# Patient Record
Sex: MALE | Race: OTHER | Employment: OTHER | URBAN - METROPOLITAN AREA
[De-identification: names, ages, dates, MRNs, and addresses within clinical notes are randomized per-mention and may not be internally consistent; named-entity substitution may affect disease eponyms.]

---

## 2017-06-23 ENCOUNTER — PREPPED CHART (OUTPATIENT)
Dept: URBAN - METROPOLITAN AREA CLINIC 39 | Facility: CLINIC | Age: 72
End: 2017-06-23

## 2017-08-10 ASSESSMENT — TONOMETRY
OS_IOP_MMHG: 13
OD_IOP_MMHG: 11

## 2017-08-10 ASSESSMENT — VISUAL ACUITY
OD_CC: 20/100+1
OS_CC: 20/40+2
OS_PH: 20/30
OS_CC: J1
OD_CC: J12-2

## 2017-08-11 ENCOUNTER — ESTABLISHED PATIENT (OUTPATIENT)
Dept: URBAN - METROPOLITAN AREA CLINIC 39 | Facility: CLINIC | Age: 72
End: 2017-08-11

## 2017-08-11 DIAGNOSIS — H34.8310: ICD-10-CM

## 2017-08-11 DIAGNOSIS — H43.813: ICD-10-CM

## 2017-08-11 PROCEDURE — 1036F TOBACCO NON-USER: CPT

## 2017-08-11 PROCEDURE — G8785 BP SCRN NO PERF AT INTERVAL: HCPCS

## 2017-08-11 PROCEDURE — 92134 CPTRZ OPH DX IMG PST SGM RTA: CPT

## 2017-08-11 PROCEDURE — G8428 CUR MEDS NOT DOCUMENT: HCPCS

## 2017-08-11 PROCEDURE — 67028 INJECTION EYE DRUG: CPT

## 2017-08-11 PROCEDURE — 92014 COMPRE OPH EXAM EST PT 1/>: CPT

## 2017-08-11 ASSESSMENT — TONOMETRY
OS_IOP_MMHG: 12
OD_IOP_MMHG: 16

## 2017-08-11 ASSESSMENT — VISUAL ACUITY
OS_CC: 20/40
OD_CC: 20/200

## 2017-10-30 ENCOUNTER — ESTABLISHED PATIENT (OUTPATIENT)
Dept: URBAN - METROPOLITAN AREA CLINIC 39 | Facility: CLINIC | Age: 72
End: 2017-10-30

## 2017-10-30 DIAGNOSIS — H35.031: ICD-10-CM

## 2017-10-30 DIAGNOSIS — H43.813: ICD-10-CM

## 2017-10-30 DIAGNOSIS — H34.8310: ICD-10-CM

## 2017-10-30 DIAGNOSIS — E11.9: ICD-10-CM

## 2017-10-30 PROCEDURE — 9222550 BILAT EXTENDED OPHTHALMOSCOPY, FIRST

## 2017-10-30 PROCEDURE — 92134 CPTRZ OPH DX IMG PST SGM RTA: CPT

## 2017-10-30 PROCEDURE — 3072F LOW RISK FOR RETINOPATHY: CPT

## 2017-10-30 PROCEDURE — G8427 DOCREV CUR MEDS BY ELIG CLIN: HCPCS

## 2017-10-30 PROCEDURE — 67028 INJECTION EYE DRUG: CPT

## 2017-10-30 PROCEDURE — G8785 BP SCRN NO PERF AT INTERVAL: HCPCS

## 2017-10-30 PROCEDURE — 1036F TOBACCO NON-USER: CPT

## 2017-10-30 PROCEDURE — 2022F DILAT RTA XM EVC RTNOPTHY: CPT

## 2017-10-30 PROCEDURE — 92014 COMPRE OPH EXAM EST PT 1/>: CPT

## 2017-10-30 PROCEDURE — 92250 FUNDUS PHOTOGRAPHY W/I&R: CPT

## 2017-10-30 PROCEDURE — 92287 ANT SGM IMG IR FLRSCN ANGRPH: CPT

## 2017-10-30 ASSESSMENT — TONOMETRY
OS_IOP_MMHG: 16
OD_IOP_MMHG: 17

## 2017-10-30 ASSESSMENT — VISUAL ACUITY
OS_CC: 20/25-2
OD_CC: 20/50-2

## 2018-01-05 ENCOUNTER — ESTABLISHED PATIENT (OUTPATIENT)
Dept: URBAN - METROPOLITAN AREA CLINIC 39 | Facility: CLINIC | Age: 73
End: 2018-01-05

## 2018-01-05 DIAGNOSIS — E11.9: ICD-10-CM

## 2018-01-05 DIAGNOSIS — H34.8310: ICD-10-CM

## 2018-01-05 DIAGNOSIS — H43.813: ICD-10-CM

## 2018-01-05 DIAGNOSIS — H35.031: ICD-10-CM

## 2018-01-05 PROCEDURE — G8785 BP SCRN NO PERF AT INTERVAL: HCPCS

## 2018-01-05 PROCEDURE — 92012 INTRM OPH EXAM EST PATIENT: CPT

## 2018-01-05 PROCEDURE — 92134 CPTRZ OPH DX IMG PST SGM RTA: CPT

## 2018-01-05 PROCEDURE — 3072F LOW RISK FOR RETINOPATHY: CPT

## 2018-01-05 PROCEDURE — 67028 INJECTION EYE DRUG: CPT

## 2018-01-05 PROCEDURE — G8427 DOCREV CUR MEDS BY ELIG CLIN: HCPCS

## 2018-01-05 PROCEDURE — 2022F DILAT RTA XM EVC RTNOPTHY: CPT

## 2018-01-05 PROCEDURE — 1036F TOBACCO NON-USER: CPT

## 2018-01-05 ASSESSMENT — TONOMETRY
OD_IOP_MMHG: 15
OS_IOP_MMHG: 14

## 2018-01-05 ASSESSMENT — VISUAL ACUITY
OS_SC: 20/25-1
OD_SC: 20/60

## 2018-02-14 ENCOUNTER — ESTABLISHED PATIENT (OUTPATIENT)
Dept: URBAN - METROPOLITAN AREA CLINIC 39 | Facility: CLINIC | Age: 73
End: 2018-02-14

## 2018-02-14 DIAGNOSIS — H43.813: ICD-10-CM

## 2018-02-14 DIAGNOSIS — H25.9: ICD-10-CM

## 2018-02-14 DIAGNOSIS — H35.031: ICD-10-CM

## 2018-02-14 DIAGNOSIS — E11.9: ICD-10-CM

## 2018-02-14 DIAGNOSIS — H34.8310: ICD-10-CM

## 2018-02-14 DIAGNOSIS — H21.89: ICD-10-CM

## 2018-02-14 PROCEDURE — 1036F TOBACCO NON-USER: CPT

## 2018-02-14 PROCEDURE — G8427 DOCREV CUR MEDS BY ELIG CLIN: HCPCS

## 2018-02-14 PROCEDURE — 2022F DILAT RTA XM EVC RTNOPTHY: CPT

## 2018-02-14 PROCEDURE — 92134 CPTRZ OPH DX IMG PST SGM RTA: CPT

## 2018-02-14 PROCEDURE — 3072F LOW RISK FOR RETINOPATHY: CPT

## 2018-02-14 PROCEDURE — 92012 INTRM OPH EXAM EST PATIENT: CPT

## 2018-02-14 PROCEDURE — 67028 INJECTION EYE DRUG: CPT

## 2018-02-14 PROCEDURE — 9222650 BILAT EXTENDED OPHTHALMOSCOPY, F/U

## 2018-02-14 PROCEDURE — 92235 FLUORESCEIN ANGRPH MLTIFRAME: CPT

## 2018-02-14 PROCEDURE — G8785 BP SCRN NO PERF AT INTERVAL: HCPCS

## 2018-02-14 PROCEDURE — 92287 ANT SGM IMG IR FLRSCN ANGRPH: CPT

## 2018-02-14 ASSESSMENT — VISUAL ACUITY
OD_PH: 20/40-1
OD_CC: 20/60
OS_CC: 20/25-1

## 2018-02-14 ASSESSMENT — TONOMETRY
OD_IOP_MMHG: 15
OS_IOP_MMHG: 13

## 2018-03-28 ENCOUNTER — ESTABLISHED PATIENT (OUTPATIENT)
Dept: URBAN - METROPOLITAN AREA CLINIC 39 | Facility: CLINIC | Age: 73
End: 2018-03-28

## 2018-03-28 DIAGNOSIS — H34.8310: ICD-10-CM

## 2018-03-28 DIAGNOSIS — H43.813: ICD-10-CM

## 2018-03-28 DIAGNOSIS — H35.031: ICD-10-CM

## 2018-03-28 DIAGNOSIS — H21.89: ICD-10-CM

## 2018-03-28 DIAGNOSIS — E11.9: ICD-10-CM

## 2018-03-28 PROCEDURE — 92012 INTRM OPH EXAM EST PATIENT: CPT

## 2018-03-28 PROCEDURE — 9222650 BILAT EXTENDED OPHTHALMOSCOPY, F/U

## 2018-03-28 PROCEDURE — 1036F TOBACCO NON-USER: CPT

## 2018-03-28 PROCEDURE — G8427 DOCREV CUR MEDS BY ELIG CLIN: HCPCS

## 2018-03-28 PROCEDURE — 92134 CPTRZ OPH DX IMG PST SGM RTA: CPT

## 2018-03-28 PROCEDURE — 67028 INJECTION EYE DRUG: CPT

## 2018-03-28 PROCEDURE — 3072F LOW RISK FOR RETINOPATHY: CPT

## 2018-03-28 PROCEDURE — 2022F DILAT RTA XM EVC RTNOPTHY: CPT

## 2018-03-28 PROCEDURE — G8785 BP SCRN NO PERF AT INTERVAL: HCPCS

## 2018-03-28 ASSESSMENT — VISUAL ACUITY
OS_CC: 20/30-2
OD_CC: 20/70+2

## 2018-03-28 ASSESSMENT — TONOMETRY
OD_IOP_MMHG: 16
OS_IOP_MMHG: 14

## 2018-06-04 ENCOUNTER — ESTABLISHED PATIENT (OUTPATIENT)
Dept: URBAN - METROPOLITAN AREA CLINIC 39 | Facility: CLINIC | Age: 73
End: 2018-06-04

## 2018-06-04 DIAGNOSIS — H35.031: ICD-10-CM

## 2018-06-04 DIAGNOSIS — H21.89: ICD-10-CM

## 2018-06-04 DIAGNOSIS — E11.9: ICD-10-CM

## 2018-06-04 DIAGNOSIS — H34.8310: ICD-10-CM

## 2018-06-04 DIAGNOSIS — H43.813: ICD-10-CM

## 2018-06-04 PROCEDURE — 92235 FLUORESCEIN ANGRPH MLTIFRAME: CPT

## 2018-06-04 PROCEDURE — G8427 DOCREV CUR MEDS BY ELIG CLIN: HCPCS

## 2018-06-04 PROCEDURE — 92134 CPTRZ OPH DX IMG PST SGM RTA: CPT

## 2018-06-04 PROCEDURE — 1036F TOBACCO NON-USER: CPT

## 2018-06-04 PROCEDURE — 67028 INJECTION EYE DRUG: CPT

## 2018-06-04 PROCEDURE — 9222650 BILAT EXTENDED OPHTHALMOSCOPY, F/U

## 2018-06-04 PROCEDURE — 92012 INTRM OPH EXAM EST PATIENT: CPT

## 2018-06-04 PROCEDURE — 2022F DILAT RTA XM EVC RTNOPTHY: CPT

## 2018-06-04 PROCEDURE — 92250 FUNDUS PHOTOGRAPHY W/I&R: CPT

## 2018-06-04 PROCEDURE — 3072F LOW RISK FOR RETINOPATHY: CPT

## 2018-06-04 PROCEDURE — G8785 BP SCRN NO PERF AT INTERVAL: HCPCS

## 2018-06-04 PROCEDURE — 92287 ANT SGM IMG IR FLRSCN ANGRPH: CPT

## 2018-06-04 ASSESSMENT — VISUAL ACUITY
OD_CC: 20/70-2
OS_CC: 20/40+1

## 2018-06-04 ASSESSMENT — TONOMETRY
OD_IOP_MMHG: 16
OS_IOP_MMHG: 16

## 2018-07-09 ENCOUNTER — ESTABLISHED PATIENT (OUTPATIENT)
Dept: URBAN - METROPOLITAN AREA CLINIC 39 | Facility: CLINIC | Age: 73
End: 2018-07-09

## 2018-07-09 DIAGNOSIS — E11.9: ICD-10-CM

## 2018-07-09 DIAGNOSIS — H34.8310: ICD-10-CM

## 2018-07-09 DIAGNOSIS — H43.813: ICD-10-CM

## 2018-07-09 DIAGNOSIS — H35.031: ICD-10-CM

## 2018-07-09 PROCEDURE — G8428 CUR MEDS NOT DOCUMENT: HCPCS

## 2018-07-09 PROCEDURE — G8785 BP SCRN NO PERF AT INTERVAL: HCPCS

## 2018-07-09 PROCEDURE — 3072F LOW RISK FOR RETINOPATHY: CPT

## 2018-07-09 PROCEDURE — 92134 CPTRZ OPH DX IMG PST SGM RTA: CPT

## 2018-07-09 PROCEDURE — 67028 INJECTION EYE DRUG: CPT

## 2018-07-09 PROCEDURE — 2022F DILAT RTA XM EVC RTNOPTHY: CPT

## 2018-07-09 PROCEDURE — 92012 INTRM OPH EXAM EST PATIENT: CPT

## 2018-07-09 PROCEDURE — 9222650 BILAT EXTENDED OPHTHALMOSCOPY, F/U

## 2018-07-09 PROCEDURE — 1036F TOBACCO NON-USER: CPT

## 2018-07-09 ASSESSMENT — TONOMETRY
OD_IOP_MMHG: 19
OS_IOP_MMHG: 17

## 2018-07-09 ASSESSMENT — VISUAL ACUITY
OS_CC: 20/25-2
OD_CC: 20/70

## 2018-08-06 ENCOUNTER — ESTABLISHED PATIENT (OUTPATIENT)
Dept: URBAN - METROPOLITAN AREA CLINIC 39 | Facility: CLINIC | Age: 73
End: 2018-08-06

## 2018-08-06 DIAGNOSIS — H34.8310: ICD-10-CM

## 2018-08-06 DIAGNOSIS — H35.031: ICD-10-CM

## 2018-08-06 DIAGNOSIS — H35.3112: ICD-10-CM

## 2018-08-06 DIAGNOSIS — H43.813: ICD-10-CM

## 2018-08-06 DIAGNOSIS — H35.3122: ICD-10-CM

## 2018-08-06 DIAGNOSIS — H21.89: ICD-10-CM

## 2018-08-06 DIAGNOSIS — E11.9: ICD-10-CM

## 2018-08-06 PROCEDURE — 92235 FLUORESCEIN ANGRPH MLTIFRAME: CPT

## 2018-08-06 PROCEDURE — G9903 PT SCRN TBCO ID AS NON USER: HCPCS

## 2018-08-06 PROCEDURE — 9222650 BILAT EXTENDED OPHTHALMOSCOPY, F/U

## 2018-08-06 PROCEDURE — G8428 CUR MEDS NOT DOCUMENT: HCPCS

## 2018-08-06 PROCEDURE — 92287 ANT SGM IMG IR FLRSCN ANGRPH: CPT

## 2018-08-06 PROCEDURE — 1036F TOBACCO NON-USER: CPT

## 2018-08-06 PROCEDURE — 92012 INTRM OPH EXAM EST PATIENT: CPT

## 2018-08-06 PROCEDURE — 3072F LOW RISK FOR RETINOPATHY: CPT

## 2018-08-06 PROCEDURE — 92250 FUNDUS PHOTOGRAPHY W/I&R: CPT

## 2018-08-06 PROCEDURE — G8785 BP SCRN NO PERF AT INTERVAL: HCPCS

## 2018-08-06 PROCEDURE — 2022F DILAT RTA XM EVC RTNOPTHY: CPT

## 2018-08-06 ASSESSMENT — VISUAL ACUITY
OD_CC: 20/60-2
OS_CC: 20/30-2

## 2018-08-06 ASSESSMENT — TONOMETRY
OD_IOP_MMHG: 16
OS_IOP_MMHG: 18

## 2018-09-17 ENCOUNTER — ESTABLISHED PATIENT (OUTPATIENT)
Dept: URBAN - METROPOLITAN AREA CLINIC 39 | Facility: CLINIC | Age: 73
End: 2018-09-17

## 2018-09-17 DIAGNOSIS — H35.031: ICD-10-CM

## 2018-09-17 DIAGNOSIS — E11.9: ICD-10-CM

## 2018-09-17 DIAGNOSIS — H35.3122: ICD-10-CM

## 2018-09-17 DIAGNOSIS — H43.813: ICD-10-CM

## 2018-09-17 DIAGNOSIS — H35.3112: ICD-10-CM

## 2018-09-17 DIAGNOSIS — H21.89: ICD-10-CM

## 2018-09-17 DIAGNOSIS — H35.723: ICD-10-CM

## 2018-09-17 DIAGNOSIS — H34.8310: ICD-10-CM

## 2018-09-17 PROCEDURE — 2022F DILAT RTA XM EVC RTNOPTHY: CPT

## 2018-09-17 PROCEDURE — 92012 INTRM OPH EXAM EST PATIENT: CPT

## 2018-09-17 PROCEDURE — 3072F LOW RISK FOR RETINOPATHY: CPT

## 2018-09-17 PROCEDURE — 4177F TALK PT/CRGVR RE AREDS PREV: CPT

## 2018-09-17 PROCEDURE — 9222650 BILAT EXTENDED OPHTHALMOSCOPY, F/U

## 2018-09-17 PROCEDURE — 92134 CPTRZ OPH DX IMG PST SGM RTA: CPT

## 2018-09-17 PROCEDURE — G8785 BP SCRN NO PERF AT INTERVAL: HCPCS

## 2018-09-17 PROCEDURE — 67028 INJECTION EYE DRUG: CPT

## 2018-09-17 PROCEDURE — 1036F TOBACCO NON-USER: CPT

## 2018-09-17 PROCEDURE — 2019F DILATED MACUL EXAM DONE: CPT

## 2018-09-17 PROCEDURE — G9903 PT SCRN TBCO ID AS NON USER: HCPCS

## 2018-09-17 ASSESSMENT — VISUAL ACUITY
OD_SC: 20/50-1
OS_SC: 20/40+2

## 2018-09-17 ASSESSMENT — TONOMETRY
OD_IOP_MMHG: 15
OS_IOP_MMHG: 18

## 2018-09-24 ENCOUNTER — CATARACT CONSULT (OUTPATIENT)
Dept: URBAN - METROPOLITAN AREA CLINIC 39 | Facility: CLINIC | Age: 73
End: 2018-09-24

## 2018-09-24 VITALS
DIASTOLIC BLOOD PRESSURE: 91 MMHG | RESPIRATION RATE: 16 BRPM | HEIGHT: 60 IN | SYSTOLIC BLOOD PRESSURE: 148 MMHG | HEART RATE: 52 BPM

## 2018-09-24 DIAGNOSIS — H25.813: ICD-10-CM

## 2018-09-24 DIAGNOSIS — H35.3122: ICD-10-CM

## 2018-09-24 DIAGNOSIS — H18.51: ICD-10-CM

## 2018-09-24 DIAGNOSIS — H35.30: ICD-10-CM

## 2018-09-24 DIAGNOSIS — H34.8310: ICD-10-CM

## 2018-09-24 PROCEDURE — G8428 CUR MEDS NOT DOCUMENT: HCPCS

## 2018-09-24 PROCEDURE — 92286 ANT SGM IMG I&R SPECLR MIC: CPT

## 2018-09-24 PROCEDURE — 2019F DILATED MACUL EXAM DONE: CPT

## 2018-09-24 PROCEDURE — 4177F TALK PT/CRGVR RE AREDS PREV: CPT

## 2018-09-24 PROCEDURE — 92136TC INTERFEROMETRY - TECHNICAL COMPONENT

## 2018-09-24 PROCEDURE — 92134 CPTRZ OPH DX IMG PST SGM RTA: CPT

## 2018-09-24 PROCEDURE — 92014 COMPRE OPH EXAM EST PT 1/>: CPT

## 2018-09-24 PROCEDURE — G9903 PT SCRN TBCO ID AS NON USER: HCPCS

## 2018-09-24 PROCEDURE — G8952 PRE-HTN/HTN, NO F/U, NOT GVN: HCPCS

## 2018-09-24 PROCEDURE — 92015 DETERMINE REFRACTIVE STATE: CPT

## 2018-09-24 PROCEDURE — 1036F TOBACCO NON-USER: CPT

## 2018-09-24 ASSESSMENT — TONOMETRY
OS_IOP_MMHG: 14
OD_IOP_MMHG: 14

## 2018-09-24 ASSESSMENT — VISUAL ACUITY
OD_CC: 20/50-2
OS_BAT: 20/60 W/MR
OS_SC: J12
OD_SC: J12
OD_SC: 20/50
OS_CC: 20/25
OD_PAM: 20/30
OS_SC: 20/20-2
OS_CC: J1+
OD_BAT: 20/100 W/MR
OD_CC: J7

## 2018-10-08 ENCOUNTER — ESTABLISHED PATIENT (OUTPATIENT)
Dept: URBAN - METROPOLITAN AREA CLINIC 39 | Facility: CLINIC | Age: 73
End: 2018-10-08

## 2018-10-08 VITALS
DIASTOLIC BLOOD PRESSURE: 91 MMHG | HEART RATE: 52 BPM | RESPIRATION RATE: 16 BRPM | HEIGHT: 60 IN | SYSTOLIC BLOOD PRESSURE: 148 MMHG

## 2018-10-08 DIAGNOSIS — H25.811: ICD-10-CM

## 2018-10-08 DIAGNOSIS — H25.812: ICD-10-CM

## 2018-10-08 PROCEDURE — 4040F PNEUMOC VAC/ADMIN/RCVD: CPT

## 2018-10-08 PROCEDURE — 92025-1 CORNEAL TOPOGRAPHY, INS

## 2018-10-08 PROCEDURE — G8952 PRE-HTN/HTN, NO F/U, NOT GVN: HCPCS

## 2018-10-08 PROCEDURE — 1036F TOBACCO NON-USER: CPT

## 2018-10-08 PROCEDURE — 99213 OFFICE O/P EST LOW 20 MIN: CPT

## 2018-10-08 PROCEDURE — G8427 DOCREV CUR MEDS BY ELIG CLIN: HCPCS

## 2018-10-08 PROCEDURE — G9903 PT SCRN TBCO ID AS NON USER: HCPCS

## 2018-10-08 RX ORDER — NEPAFENAC 3 MG/ML: 1 SUSPENSION/ DROPS OPHTHALMIC ONCE A DAY

## 2018-10-08 RX ORDER — MOXIFLOXACIN HYDROCHLORIDE 5 MG/ML: 1 SOLUTION/ DROPS OPHTHALMIC

## 2018-10-08 ASSESSMENT — VISUAL ACUITY
OD_PAM: 20/80
OS_BAT: 20/60
OS_CC: 20/30
OD_BAT: 20/100
OD_CC: 20/50-2
OS_AM: 20/20
OS_CC: J2
OD_CC: J10

## 2018-10-08 ASSESSMENT — TONOMETRY
OS_IOP_MMHG: 15
OD_IOP_MMHG: 15

## 2018-10-16 ENCOUNTER — SURGERY/PROCEDURE (OUTPATIENT)
Dept: URBAN - METROPOLITAN AREA CLINIC 39 | Facility: CLINIC | Age: 73
End: 2018-10-16

## 2018-10-16 ENCOUNTER — PRE-OP/H&P (OUTPATIENT)
Dept: URBAN - METROPOLITAN AREA CLINIC 39 | Facility: CLINIC | Age: 73
End: 2018-10-16

## 2018-10-16 VITALS
SYSTOLIC BLOOD PRESSURE: 148 MMHG | RESPIRATION RATE: 16 BRPM | HEART RATE: 52 BPM | DIASTOLIC BLOOD PRESSURE: 91 MMHG | HEIGHT: 60 IN

## 2018-10-16 DIAGNOSIS — H25.811: ICD-10-CM

## 2018-10-16 PROCEDURE — G8482 FLU IMMUNIZE ORDER/ADMIN: HCPCS

## 2018-10-16 PROCEDURE — G8952 PRE-HTN/HTN, NO F/U, NOT GVN: HCPCS

## 2018-10-16 PROCEDURE — 1036F TOBACCO NON-USER: CPT

## 2018-10-16 PROCEDURE — 99211T TECH SERVICE

## 2018-10-16 PROCEDURE — G9903 PT SCRN TBCO ID AS NON USER: HCPCS

## 2018-10-16 PROCEDURE — 4040F PNEUMOC VAC/ADMIN/RCVD: CPT

## 2018-10-16 PROCEDURE — G8418 CALC BMI BLW LOW PARAM F/U: HCPCS

## 2018-10-16 PROCEDURE — 66984 XCAPSL CTRC RMVL W/O ECP: CPT

## 2018-10-16 PROCEDURE — G8428 CUR MEDS NOT DOCUMENT: HCPCS

## 2018-10-17 ENCOUNTER — CATARACT POST-OP 1-DAY (OUTPATIENT)
Dept: URBAN - METROPOLITAN AREA CLINIC 39 | Facility: CLINIC | Age: 73
End: 2018-10-17

## 2018-10-17 DIAGNOSIS — Z96.1: ICD-10-CM

## 2018-10-17 PROCEDURE — 99024 POSTOP FOLLOW-UP VISIT: CPT

## 2018-10-17 ASSESSMENT — VISUAL ACUITY
OD_SC: 20/40
OD_SC: <J12
OS_SC: 20/30
OS_SC: J1

## 2018-10-17 ASSESSMENT — TONOMETRY: OD_IOP_MMHG: 19

## 2018-11-19 ENCOUNTER — ESTABLISHED COMPREHENSIVE EXAM (OUTPATIENT)
Dept: URBAN - METROPOLITAN AREA CLINIC 39 | Facility: CLINIC | Age: 73
End: 2018-11-19

## 2018-11-19 DIAGNOSIS — H43.813: ICD-10-CM

## 2018-11-19 DIAGNOSIS — H35.723: ICD-10-CM

## 2018-11-19 DIAGNOSIS — H35.3122: ICD-10-CM

## 2018-11-19 DIAGNOSIS — H35.031: ICD-10-CM

## 2018-11-19 DIAGNOSIS — H35.3112: ICD-10-CM

## 2018-11-19 DIAGNOSIS — H34.8310: ICD-10-CM

## 2018-11-19 DIAGNOSIS — H21.89: ICD-10-CM

## 2018-11-19 PROCEDURE — G8785 BP SCRN NO PERF AT INTERVAL: HCPCS

## 2018-11-19 PROCEDURE — 92242 FLUORESCEIN&ICG ANGIOGRAPHY: CPT

## 2018-11-19 PROCEDURE — G9903 PT SCRN TBCO ID AS NON USER: HCPCS

## 2018-11-19 PROCEDURE — 9222650 BILAT EXTENDED OPHTHALMOSCOPY, F/U

## 2018-11-19 PROCEDURE — G8427 DOCREV CUR MEDS BY ELIG CLIN: HCPCS

## 2018-11-19 PROCEDURE — 2019F DILATED MACUL EXAM DONE: CPT

## 2018-11-19 PROCEDURE — 67028 INJECTION EYE DRUG: CPT

## 2018-11-19 PROCEDURE — 92012 INTRM OPH EXAM EST PATIENT: CPT

## 2018-11-19 PROCEDURE — 4177F TALK PT/CRGVR RE AREDS PREV: CPT

## 2018-11-19 PROCEDURE — 92134 CPTRZ OPH DX IMG PST SGM RTA: CPT

## 2018-11-19 PROCEDURE — 1036F TOBACCO NON-USER: CPT

## 2018-11-19 ASSESSMENT — TONOMETRY
OS_IOP_MMHG: 13
OD_IOP_MMHG: 13

## 2018-11-19 ASSESSMENT — VISUAL ACUITY
OS_CC: 20/40
OD_CC: 20/40

## 2018-12-05 ENCOUNTER — CLINIC PROCEDURE ONLY (OUTPATIENT)
Dept: URBAN - METROPOLITAN AREA CLINIC 39 | Facility: CLINIC | Age: 73
End: 2018-12-05

## 2018-12-05 DIAGNOSIS — H34.8310: ICD-10-CM

## 2018-12-05 PROCEDURE — 67028 INJECTION EYE DRUG: CPT

## 2018-12-05 ASSESSMENT — TONOMETRY: OD_IOP_MMHG: 16

## 2018-12-05 ASSESSMENT — VISUAL ACUITY
OD_SC: 20/40+2
OS_SC: 20/25

## 2018-12-13 ENCOUNTER — POST-OP (OUTPATIENT)
Dept: URBAN - METROPOLITAN AREA CLINIC 39 | Facility: CLINIC | Age: 73
End: 2018-12-13

## 2018-12-13 DIAGNOSIS — Z96.1: ICD-10-CM

## 2018-12-13 PROCEDURE — 99024 POSTOP FOLLOW-UP VISIT: CPT

## 2018-12-13 ASSESSMENT — VISUAL ACUITY
OS_SC: 20/40
OD_SC: 20/40
OS_CC: J2
OD_CC: J12
OD_SC: J12
OS_SC: J6

## 2018-12-13 ASSESSMENT — TONOMETRY
OD_IOP_MMHG: 13
OS_IOP_MMHG: 12

## 2019-01-07 ENCOUNTER — ESTABLISHED PATIENT (OUTPATIENT)
Dept: URBAN - METROPOLITAN AREA CLINIC 39 | Facility: CLINIC | Age: 74
End: 2019-01-07

## 2019-01-07 DIAGNOSIS — H43.813: ICD-10-CM

## 2019-01-07 DIAGNOSIS — H34.8310: ICD-10-CM

## 2019-01-07 DIAGNOSIS — H35.3112: ICD-10-CM

## 2019-01-07 DIAGNOSIS — E11.9: ICD-10-CM

## 2019-01-07 DIAGNOSIS — H35.3122: ICD-10-CM

## 2019-01-07 PROCEDURE — 92134 CPTRZ OPH DX IMG PST SGM RTA: CPT

## 2019-01-07 PROCEDURE — 92012 INTRM OPH EXAM EST PATIENT: CPT

## 2019-01-07 PROCEDURE — G9903 PT SCRN TBCO ID AS NON USER: HCPCS

## 2019-01-07 PROCEDURE — G8428 CUR MEDS NOT DOCUMENT: HCPCS

## 2019-01-07 PROCEDURE — 1036F TOBACCO NON-USER: CPT

## 2019-01-07 PROCEDURE — 2022F DILAT RTA XM EVC RTNOPTHY: CPT

## 2019-01-07 PROCEDURE — G8785 BP SCRN NO PERF AT INTERVAL: HCPCS

## 2019-01-07 PROCEDURE — 3072F LOW RISK FOR RETINOPATHY: CPT

## 2019-01-07 ASSESSMENT — TONOMETRY
OS_IOP_MMHG: 17
OD_IOP_MMHG: 15

## 2019-01-07 ASSESSMENT — VISUAL ACUITY
OD_CC: 20/40
OS_CC: 20/25-2

## 2019-02-25 ENCOUNTER — ESTABLISHED PATIENT (OUTPATIENT)
Dept: URBAN - METROPOLITAN AREA CLINIC 39 | Facility: CLINIC | Age: 74
End: 2019-02-25

## 2019-02-25 DIAGNOSIS — H34.8310: ICD-10-CM

## 2019-02-25 DIAGNOSIS — H35.3112: ICD-10-CM

## 2019-02-25 DIAGNOSIS — H35.3122: ICD-10-CM

## 2019-02-25 PROCEDURE — 67028 INJECTION EYE DRUG: CPT

## 2019-02-25 PROCEDURE — 92226 OPHTHALMOSCOPY (SUB): CPT

## 2019-02-25 PROCEDURE — 92250 FUNDUS PHOTOGRAPHY W/I&R: CPT

## 2019-02-25 PROCEDURE — 92012 INTRM OPH EXAM EST PATIENT: CPT

## 2019-02-25 PROCEDURE — 92235 FLUORESCEIN ANGRPH MLTIFRAME: CPT

## 2019-02-25 ASSESSMENT — TONOMETRY
OD_IOP_MMHG: 17
OS_IOP_MMHG: 18

## 2019-02-25 ASSESSMENT — VISUAL ACUITY
OS_CC: 20/40-1
OD_CC: 20/40-2

## 2019-04-01 ENCOUNTER — ESTABLISHED PATIENT (OUTPATIENT)
Dept: URBAN - METROPOLITAN AREA CLINIC 39 | Facility: CLINIC | Age: 74
End: 2019-04-01

## 2019-04-01 DIAGNOSIS — H34.8310: ICD-10-CM

## 2019-04-01 DIAGNOSIS — H35.3122: ICD-10-CM

## 2019-04-01 DIAGNOSIS — H43.813: ICD-10-CM

## 2019-04-01 PROCEDURE — 92134 CPTRZ OPH DX IMG PST SGM RTA: CPT

## 2019-04-01 PROCEDURE — 67028 INJECTION EYE DRUG: CPT

## 2019-04-01 PROCEDURE — 92012 INTRM OPH EXAM EST PATIENT: CPT

## 2019-04-01 ASSESSMENT — TONOMETRY
OD_IOP_MMHG: 12
OS_IOP_MMHG: 13

## 2019-04-01 ASSESSMENT — VISUAL ACUITY
OS_CC: 20/30
OD_CC: 20/30-1

## 2019-04-15 ENCOUNTER — ESTABLISHED PATIENT (OUTPATIENT)
Dept: URBAN - METROPOLITAN AREA CLINIC 39 | Facility: CLINIC | Age: 74
End: 2019-04-15

## 2019-04-15 DIAGNOSIS — H34.8310: ICD-10-CM

## 2019-04-15 PROCEDURE — 67028 INJECTION EYE DRUG: CPT

## 2019-04-15 ASSESSMENT — TONOMETRY
OS_IOP_MMHG: 16
OD_IOP_MMHG: 15

## 2019-04-15 ASSESSMENT — VISUAL ACUITY
OS_CC: 20/40
OD_CC: 20/40

## 2019-05-13 ENCOUNTER — ESTABLISHED PATIENT (OUTPATIENT)
Dept: URBAN - METROPOLITAN AREA CLINIC 39 | Facility: CLINIC | Age: 74
End: 2019-05-13

## 2019-05-13 DIAGNOSIS — H34.8310: ICD-10-CM

## 2019-05-13 DIAGNOSIS — H35.723: ICD-10-CM

## 2019-05-13 DIAGNOSIS — H35.3112: ICD-10-CM

## 2019-05-13 DIAGNOSIS — H35.3122: ICD-10-CM

## 2019-05-13 PROCEDURE — 67028 INJECTION EYE DRUG: CPT

## 2019-05-13 PROCEDURE — 92250 FUNDUS PHOTOGRAPHY W/I&R: CPT

## 2019-05-13 PROCEDURE — 92235 FLUORESCEIN ANGRPH MLTIFRAME: CPT

## 2019-05-13 PROCEDURE — 92226 OPHTHALMOSCOPY (SUB): CPT

## 2019-05-13 PROCEDURE — 92012 INTRM OPH EXAM EST PATIENT: CPT

## 2019-05-13 ASSESSMENT — VISUAL ACUITY
OD_CC: 20/40
OS_CC: 20/40

## 2019-05-13 ASSESSMENT — TONOMETRY
OD_IOP_MMHG: 17
OS_IOP_MMHG: 16

## 2019-06-24 ENCOUNTER — ESTABLISHED PATIENT (OUTPATIENT)
Dept: URBAN - METROPOLITAN AREA CLINIC 39 | Facility: CLINIC | Age: 74
End: 2019-06-24

## 2019-06-24 DIAGNOSIS — H35.3122: ICD-10-CM

## 2019-06-24 DIAGNOSIS — H34.8310: ICD-10-CM

## 2019-06-24 DIAGNOSIS — H35.3112: ICD-10-CM

## 2019-06-24 PROCEDURE — 92250 FUNDUS PHOTOGRAPHY W/I&R: CPT

## 2019-06-24 PROCEDURE — 67028 INJECTION EYE DRUG: CPT

## 2019-06-24 PROCEDURE — 92012 INTRM OPH EXAM EST PATIENT: CPT

## 2019-06-24 PROCEDURE — 92134 CPTRZ OPH DX IMG PST SGM RTA: CPT

## 2019-06-24 ASSESSMENT — TONOMETRY
OD_IOP_MMHG: 16
OS_IOP_MMHG: 15

## 2019-06-24 ASSESSMENT — VISUAL ACUITY
OS_CC: 20/40
OS_PH: 20/30-2
OD_CC: 20/40

## 2019-08-19 ENCOUNTER — ESTABLISHED PATIENT (OUTPATIENT)
Dept: URBAN - METROPOLITAN AREA CLINIC 39 | Facility: CLINIC | Age: 74
End: 2019-08-19

## 2019-08-19 DIAGNOSIS — H35.723: ICD-10-CM

## 2019-08-19 DIAGNOSIS — H35.3122: ICD-10-CM

## 2019-08-19 DIAGNOSIS — H35.3112: ICD-10-CM

## 2019-08-19 DIAGNOSIS — H34.8310: ICD-10-CM

## 2019-08-19 PROCEDURE — 92012 INTRM OPH EXAM EST PATIENT: CPT

## 2019-08-19 PROCEDURE — 67028 INJECTION EYE DRUG: CPT

## 2019-08-19 PROCEDURE — 9222650 BILAT EXTENDED OPHTHALMOSCOPY, F/U

## 2019-08-19 PROCEDURE — 92134 CPTRZ OPH DX IMG PST SGM RTA: CPT

## 2019-08-19 PROCEDURE — 92235 FLUORESCEIN ANGRPH MLTIFRAME: CPT

## 2019-08-19 ASSESSMENT — TONOMETRY
OS_IOP_MMHG: 14
OD_IOP_MMHG: 15

## 2019-08-19 ASSESSMENT — VISUAL ACUITY
OD_CC: 20/30-1
OS_CC: 20/40

## 2019-10-28 ENCOUNTER — ESTABLISHED PATIENT (OUTPATIENT)
Dept: URBAN - METROPOLITAN AREA CLINIC 39 | Facility: CLINIC | Age: 74
End: 2019-10-28

## 2019-10-28 DIAGNOSIS — H35.3122: ICD-10-CM

## 2019-10-28 DIAGNOSIS — H34.8310: ICD-10-CM

## 2019-10-28 DIAGNOSIS — H35.3112: ICD-10-CM

## 2019-10-28 DIAGNOSIS — H35.723: ICD-10-CM

## 2019-10-28 PROCEDURE — 67028 INJECTION EYE DRUG: CPT

## 2019-10-28 PROCEDURE — 92250 FUNDUS PHOTOGRAPHY W/I&R: CPT

## 2019-10-28 PROCEDURE — 92235 FLUORESCEIN ANGRPH MLTIFRAME: CPT

## 2019-10-28 PROCEDURE — 92012 INTRM OPH EXAM EST PATIENT: CPT

## 2019-10-28 PROCEDURE — 9222650 BILAT EXTENDED OPHTHALMOSCOPY, F/U

## 2019-10-28 ASSESSMENT — TONOMETRY
OS_IOP_MMHG: 16
OD_IOP_MMHG: 14

## 2019-10-28 ASSESSMENT — VISUAL ACUITY
OS_CC: 20/40+2
OD_CC: 20/40

## 2019-11-04 ENCOUNTER — CLINIC PROCEDURE ONLY (OUTPATIENT)
Dept: URBAN - METROPOLITAN AREA CLINIC 39 | Facility: CLINIC | Age: 74
End: 2019-11-04

## 2019-11-04 DIAGNOSIS — H34.8310: ICD-10-CM

## 2019-11-04 PROCEDURE — 67028 INJECTION EYE DRUG: CPT

## 2019-11-04 ASSESSMENT — VISUAL ACUITY: OD_CC: 20/40

## 2019-11-04 ASSESSMENT — TONOMETRY: OD_IOP_MMHG: 14

## 2019-12-02 ENCOUNTER — ESTABLISHED PATIENT (OUTPATIENT)
Dept: URBAN - METROPOLITAN AREA CLINIC 39 | Facility: CLINIC | Age: 74
End: 2019-12-02

## 2019-12-02 DIAGNOSIS — H35.3122: ICD-10-CM

## 2019-12-02 DIAGNOSIS — H34.8310: ICD-10-CM

## 2019-12-02 DIAGNOSIS — H35.3112: ICD-10-CM

## 2019-12-02 PROCEDURE — 92273 FULL FIELD ERG W/I&R: CPT

## 2019-12-02 PROCEDURE — 92134 CPTRZ OPH DX IMG PST SGM RTA: CPT

## 2019-12-02 PROCEDURE — 67028 INJECTION EYE DRUG: CPT

## 2019-12-02 PROCEDURE — 92012 INTRM OPH EXAM EST PATIENT: CPT

## 2019-12-02 ASSESSMENT — TONOMETRY
OD_IOP_MMHG: 13
OS_IOP_MMHG: 15

## 2019-12-02 ASSESSMENT — VISUAL ACUITY
OS_PH: 20/40
OS_CC: 20/50
OD_CC: 20/40

## 2020-01-29 ENCOUNTER — ESTABLISHED PATIENT (OUTPATIENT)
Dept: URBAN - METROPOLITAN AREA CLINIC 39 | Facility: CLINIC | Age: 75
End: 2020-01-29

## 2020-01-29 DIAGNOSIS — H43.813: ICD-10-CM

## 2020-01-29 DIAGNOSIS — H35.3112: ICD-10-CM

## 2020-01-29 DIAGNOSIS — H35.031: ICD-10-CM

## 2020-01-29 DIAGNOSIS — H35.3122: ICD-10-CM

## 2020-01-29 DIAGNOSIS — H34.8310: ICD-10-CM

## 2020-01-29 DIAGNOSIS — H35.041: ICD-10-CM

## 2020-01-29 DIAGNOSIS — H35.30: ICD-10-CM

## 2020-01-29 DIAGNOSIS — H35.363: ICD-10-CM

## 2020-01-29 DIAGNOSIS — H35.81: ICD-10-CM

## 2020-01-29 DIAGNOSIS — H35.723: ICD-10-CM

## 2020-01-29 PROCEDURE — 92134 CPTRZ OPH DX IMG PST SGM RTA: CPT

## 2020-01-29 PROCEDURE — 92235 FLUORESCEIN ANGRPH MLTIFRAME: CPT

## 2020-01-29 PROCEDURE — 92250 FUNDUS PHOTOGRAPHY W/I&R: CPT

## 2020-01-29 PROCEDURE — 92012 INTRM OPH EXAM EST PATIENT: CPT

## 2020-01-29 PROCEDURE — 67028 INJECTION EYE DRUG: CPT

## 2020-01-29 ASSESSMENT — TONOMETRY
OS_IOP_MMHG: 16
OD_IOP_MMHG: 15

## 2020-01-29 ASSESSMENT — VISUAL ACUITY
OS_PH: 20/40-1
OD_CC: 20/40
OS_CC: 20/50

## 2020-05-20 ENCOUNTER — ESTABLISHED PATIENT (OUTPATIENT)
Dept: URBAN - METROPOLITAN AREA CLINIC 39 | Facility: CLINIC | Age: 75
End: 2020-05-20

## 2020-05-20 DIAGNOSIS — H35.723: ICD-10-CM

## 2020-05-20 DIAGNOSIS — H35.3112: ICD-10-CM

## 2020-05-20 DIAGNOSIS — H35.041: ICD-10-CM

## 2020-05-20 DIAGNOSIS — H35.3122: ICD-10-CM

## 2020-05-20 DIAGNOSIS — H34.8310: ICD-10-CM

## 2020-05-20 DIAGNOSIS — E11.9: ICD-10-CM

## 2020-05-20 PROCEDURE — J0178PRE EYLEA PREFILLED SYRINGE

## 2020-05-20 PROCEDURE — 67028 INJECTION EYE DRUG: CPT

## 2020-05-20 PROCEDURE — 92202 OPSCPY EXTND ON/MAC DRAW: CPT

## 2020-05-20 PROCEDURE — 92134 CPTRZ OPH DX IMG PST SGM RTA: CPT

## 2020-05-20 PROCEDURE — 92242 FLUORESCEIN&ICG ANGIOGRAPHY: CPT

## 2020-05-20 PROCEDURE — 92014 COMPRE OPH EXAM EST PT 1/>: CPT

## 2020-05-20 PROCEDURE — 92273 FULL FIELD ERG W/I&R: CPT

## 2020-05-20 ASSESSMENT — VISUAL ACUITY
OD_SC: 20/70
OS_SC: 20/60+1

## 2020-06-01 ENCOUNTER — CLINIC PROCEDURE ONLY (OUTPATIENT)
Dept: URBAN - METROPOLITAN AREA CLINIC 39 | Facility: CLINIC | Age: 75
End: 2020-06-01

## 2020-06-01 DIAGNOSIS — H34.8310: ICD-10-CM

## 2020-06-01 PROCEDURE — 67028 INJECTION EYE DRUG: CPT

## 2020-06-01 ASSESSMENT — VISUAL ACUITY
OD_SC: 20/60
OS_SC: 20/40

## 2020-07-06 ENCOUNTER — ESTABLISHED PATIENT (OUTPATIENT)
Dept: URBAN - METROPOLITAN AREA CLINIC 39 | Facility: CLINIC | Age: 75
End: 2020-07-06

## 2020-07-06 DIAGNOSIS — H43.813: ICD-10-CM

## 2020-07-06 DIAGNOSIS — H35.041: ICD-10-CM

## 2020-07-06 DIAGNOSIS — H35.81: ICD-10-CM

## 2020-07-06 DIAGNOSIS — H35.723: ICD-10-CM

## 2020-07-06 DIAGNOSIS — H35.031: ICD-10-CM

## 2020-07-06 DIAGNOSIS — H35.363: ICD-10-CM

## 2020-07-06 DIAGNOSIS — H34.8310: ICD-10-CM

## 2020-07-06 DIAGNOSIS — E11.9: ICD-10-CM

## 2020-07-06 DIAGNOSIS — H35.3132: ICD-10-CM

## 2020-07-06 PROCEDURE — 92014 COMPRE OPH EXAM EST PT 1/>: CPT

## 2020-07-06 PROCEDURE — 92134 CPTRZ OPH DX IMG PST SGM RTA: CPT

## 2020-07-06 PROCEDURE — 67028 INJECTION EYE DRUG: CPT

## 2020-07-06 PROCEDURE — 92201 OPSCPY EXTND RTA DRAW UNI/BI: CPT

## 2020-07-06 PROCEDURE — J0178PRE EYLEA PREFILLED SYRINGE

## 2020-07-06 ASSESSMENT — VISUAL ACUITY
OS_CC: 20/50-1
OS_SC: 20/40-1
OD_CC: 20/50-2

## 2020-07-13 ENCOUNTER — CATARACT CONSULT (OUTPATIENT)
Dept: URBAN - METROPOLITAN AREA CLINIC 39 | Facility: CLINIC | Age: 75
End: 2020-07-13

## 2020-07-13 DIAGNOSIS — H35.3132: ICD-10-CM

## 2020-07-13 DIAGNOSIS — H25.812: ICD-10-CM

## 2020-07-13 DIAGNOSIS — H35.723: ICD-10-CM

## 2020-07-13 DIAGNOSIS — E11.9: ICD-10-CM

## 2020-07-13 DIAGNOSIS — Z96.1: ICD-10-CM

## 2020-07-13 DIAGNOSIS — H34.8310: ICD-10-CM

## 2020-07-13 PROCEDURE — 92136TC INTERFEROMETRY - TECHNICAL COMPONENT

## 2020-07-13 PROCEDURE — 92025-1 CORNEAL TOPOGRAPHY, INS

## 2020-07-13 PROCEDURE — 92015 DETERMINE REFRACTIVE STATE: CPT

## 2020-07-13 PROCEDURE — 99214 OFFICE O/P EST MOD 30 MIN: CPT

## 2020-07-13 PROCEDURE — 92134 CPTRZ OPH DX IMG PST SGM RTA: CPT

## 2020-07-13 RX ORDER — DUREZOL 0.5 MG/ML: 1 EMULSION OPHTHALMIC TWICE A DAY

## 2020-07-13 RX ORDER — NEPAFENAC 3 MG/ML: 1 SUSPENSION/ DROPS OPHTHALMIC ONCE A DAY

## 2020-07-13 RX ORDER — MOXIFLOXACIN HYDROCHLORIDE 5 MG/ML: 1 SOLUTION/ DROPS OPHTHALMIC

## 2020-07-13 ASSESSMENT — TONOMETRY
OD_IOP_MMHG: 13
OS_IOP_MMHG: 13

## 2020-07-13 ASSESSMENT — VISUAL ACUITY
OD_CC: J6
OS_AM: 20/20
OD_SC: 20/60
OS_SC: 20/40
OS_BAT: 20/100
OS_SC: J5
OD_CC: 20/40-2
OS_CC: J3
OS_CC: 20/40+2
OD_SC: J12

## 2020-07-21 ENCOUNTER — PRE-OP/H&P (OUTPATIENT)
Dept: URBAN - METROPOLITAN AREA CLINIC 39 | Facility: CLINIC | Age: 75
End: 2020-07-21

## 2020-07-21 ENCOUNTER — SURGERY/PROCEDURE (OUTPATIENT)
Dept: URBAN - METROPOLITAN AREA CLINIC 39 | Facility: CLINIC | Age: 75
End: 2020-07-21

## 2020-07-21 DIAGNOSIS — H35.81: ICD-10-CM

## 2020-07-21 DIAGNOSIS — H25.812: ICD-10-CM

## 2020-07-21 DIAGNOSIS — H35.041: ICD-10-CM

## 2020-07-21 DIAGNOSIS — H31.013: ICD-10-CM

## 2020-07-21 DIAGNOSIS — H43.813: ICD-10-CM

## 2020-07-21 DIAGNOSIS — E11.9: ICD-10-CM

## 2020-07-21 PROCEDURE — 66984 XCAPSL CTRC RMVL W/O ECP: CPT

## 2020-07-21 PROCEDURE — 99211T TECH SERVICE

## 2020-07-21 RX ORDER — MOXIFLOXACIN HYDROCHLORIDE 5 MG/ML: 1 SOLUTION/ DROPS OPHTHALMIC

## 2020-07-21 RX ORDER — NEPAFENAC 3 MG/ML: 1 SUSPENSION/ DROPS OPHTHALMIC ONCE A DAY

## 2020-07-21 RX ORDER — DUREZOL 0.5 MG/ML: 1 EMULSION OPHTHALMIC TWICE A DAY

## 2020-07-22 ENCOUNTER — 1 DAY POST-OP (OUTPATIENT)
Dept: URBAN - METROPOLITAN AREA CLINIC 39 | Facility: CLINIC | Age: 75
End: 2020-07-22

## 2020-07-22 DIAGNOSIS — E11.9: ICD-10-CM

## 2020-07-22 DIAGNOSIS — H35.81: ICD-10-CM

## 2020-07-22 DIAGNOSIS — H31.013: ICD-10-CM

## 2020-07-22 DIAGNOSIS — Z96.1: ICD-10-CM

## 2020-07-22 DIAGNOSIS — H43.813: ICD-10-CM

## 2020-07-22 DIAGNOSIS — H35.041: ICD-10-CM

## 2020-07-22 PROCEDURE — 99024 POSTOP FOLLOW-UP VISIT: CPT

## 2020-07-22 ASSESSMENT — TONOMETRY
OD_IOP_MMHG: 13
OS_IOP_MMHG: 23

## 2020-07-22 ASSESSMENT — VISUAL ACUITY
OD_SC: 20/80
OS_SC: J3
OU_SC: 20/25+1
OS_SC: 20/25+1
OD_SC: J12
OU_SC: J3

## 2020-08-17 ENCOUNTER — ESTABLISHED PATIENT (OUTPATIENT)
Dept: URBAN - METROPOLITAN AREA CLINIC 39 | Facility: CLINIC | Age: 75
End: 2020-08-17

## 2020-08-17 DIAGNOSIS — H35.031: ICD-10-CM

## 2020-08-17 DIAGNOSIS — H35.723: ICD-10-CM

## 2020-08-17 DIAGNOSIS — H34.8310: ICD-10-CM

## 2020-08-17 DIAGNOSIS — E11.9: ICD-10-CM

## 2020-08-17 DIAGNOSIS — H31.013: ICD-10-CM

## 2020-08-17 DIAGNOSIS — H35.3132: ICD-10-CM

## 2020-08-17 DIAGNOSIS — H43.813: ICD-10-CM

## 2020-08-17 PROCEDURE — 67028 INJECTION EYE DRUG: CPT

## 2020-08-17 PROCEDURE — 92012 INTRM OPH EXAM EST PATIENT: CPT

## 2020-08-17 PROCEDURE — 92134 CPTRZ OPH DX IMG PST SGM RTA: CPT

## 2020-08-17 PROCEDURE — 92242 FLUORESCEIN&ICG ANGIOGRAPHY: CPT

## 2020-08-17 PROCEDURE — 92202 OPSCPY EXTND ON/MAC DRAW: CPT

## 2020-08-17 PROCEDURE — J0178PRE EYLEA PREFILLED SYRINGE

## 2020-08-17 ASSESSMENT — TONOMETRY
OD_IOP_MMHG: 19
OS_IOP_MMHG: 16

## 2020-08-17 ASSESSMENT — VISUAL ACUITY
OS_SC: 20/30
OD_PH: 20/40-2
OD_SC: 20/50-2

## 2020-08-19 ENCOUNTER — POST-OP (OUTPATIENT)
Dept: URBAN - METROPOLITAN AREA CLINIC 39 | Facility: CLINIC | Age: 75
End: 2020-08-19

## 2020-08-19 DIAGNOSIS — Z96.1: ICD-10-CM

## 2020-08-19 PROCEDURE — 99024 POSTOP FOLLOW-UP VISIT: CPT

## 2020-08-19 ASSESSMENT — VISUAL ACUITY
OS_CC: J1
OS_SC: 20/25
OD_CC: J1
OU_CC: J1
OD_SC: J10
OU_SC: 20/40+2
OS_SC: J2
OD_SC: 20/50-2
OU_SC: J2

## 2020-08-19 ASSESSMENT — TONOMETRY
OD_IOP_MMHG: 17
OS_IOP_MMHG: 18

## 2020-09-28 ENCOUNTER — ESTABLISHED PATIENT (OUTPATIENT)
Dept: URBAN - METROPOLITAN AREA CLINIC 39 | Facility: CLINIC | Age: 75
End: 2020-09-28

## 2020-09-28 DIAGNOSIS — H34.8310: ICD-10-CM

## 2020-09-28 DIAGNOSIS — E11.9: ICD-10-CM

## 2020-09-28 DIAGNOSIS — H35.3132: ICD-10-CM

## 2020-09-28 DIAGNOSIS — H43.813: ICD-10-CM

## 2020-09-28 PROCEDURE — 92134 CPTRZ OPH DX IMG PST SGM RTA: CPT

## 2020-09-28 PROCEDURE — 92202 OPSCPY EXTND ON/MAC DRAW: CPT

## 2020-09-28 PROCEDURE — 92012 INTRM OPH EXAM EST PATIENT: CPT

## 2020-09-28 PROCEDURE — 92273 FULL FIELD ERG W/I&R: CPT

## 2020-09-28 PROCEDURE — 67028 INJECTION EYE DRUG: CPT

## 2020-09-28 ASSESSMENT — TONOMETRY
OD_IOP_MMHG: 14
OS_IOP_MMHG: 11

## 2020-09-28 ASSESSMENT — VISUAL ACUITY
OD_SC: 20/50-1
OD_PH: 20/40-3
OS_SC: 20/40-2

## 2020-12-02 ENCOUNTER — ESTABLISHED PATIENT (OUTPATIENT)
Dept: URBAN - METROPOLITAN AREA CLINIC 39 | Facility: CLINIC | Age: 75
End: 2020-12-02

## 2020-12-02 DIAGNOSIS — H35.723: ICD-10-CM

## 2020-12-02 DIAGNOSIS — H35.3132: ICD-10-CM

## 2020-12-02 DIAGNOSIS — H34.8310: ICD-10-CM

## 2020-12-02 PROCEDURE — 92242 FLUORESCEIN&ICG ANGIOGRAPHY: CPT

## 2020-12-02 PROCEDURE — J0178PRE EYLEA PREFILLED SYRINGE

## 2020-12-02 PROCEDURE — 92012 INTRM OPH EXAM EST PATIENT: CPT

## 2020-12-02 PROCEDURE — 92202 OPSCPY EXTND ON/MAC DRAW: CPT

## 2020-12-02 PROCEDURE — 92134 CPTRZ OPH DX IMG PST SGM RTA: CPT

## 2020-12-02 PROCEDURE — 67028 INJECTION EYE DRUG: CPT

## 2020-12-02 ASSESSMENT — TONOMETRY
OD_IOP_MMHG: 15
OS_IOP_MMHG: 14

## 2020-12-02 ASSESSMENT — VISUAL ACUITY
OS_SC: 20/25+1
OD_SC: 20/25+1

## 2021-02-10 ENCOUNTER — ESTABLISHED PATIENT (OUTPATIENT)
Dept: URBAN - METROPOLITAN AREA CLINIC 39 | Facility: CLINIC | Age: 76
End: 2021-02-10

## 2021-02-10 DIAGNOSIS — H35.363: ICD-10-CM

## 2021-02-10 DIAGNOSIS — H43.813: ICD-10-CM

## 2021-02-10 DIAGNOSIS — H34.8310: ICD-10-CM

## 2021-02-10 DIAGNOSIS — H35.3132: ICD-10-CM

## 2021-02-10 DIAGNOSIS — H35.723: ICD-10-CM

## 2021-02-10 PROCEDURE — 92250 FUNDUS PHOTOGRAPHY W/I&R: CPT

## 2021-02-10 PROCEDURE — 92273 FULL FIELD ERG W/I&R: CPT

## 2021-02-10 PROCEDURE — J0178PRE EYLEA PREFILLED SYRINGE

## 2021-02-10 PROCEDURE — 67028 INJECTION EYE DRUG: CPT

## 2021-02-10 PROCEDURE — 99213 OFFICE O/P EST LOW 20 MIN: CPT

## 2021-02-10 ASSESSMENT — TONOMETRY
OS_IOP_MMHG: 12
OD_IOP_MMHG: 14

## 2021-02-10 ASSESSMENT — VISUAL ACUITY
OS_SC: 20/30
OD_SC: 20/30

## 2021-04-07 ENCOUNTER — ESTABLISHED PATIENT (OUTPATIENT)
Dept: URBAN - METROPOLITAN AREA CLINIC 39 | Facility: CLINIC | Age: 76
End: 2021-04-07

## 2021-04-07 DIAGNOSIS — H35.723: ICD-10-CM

## 2021-04-07 DIAGNOSIS — H35.3132: ICD-10-CM

## 2021-04-07 DIAGNOSIS — H43.813: ICD-10-CM

## 2021-04-07 DIAGNOSIS — H34.8310: ICD-10-CM

## 2021-04-07 DIAGNOSIS — H35.363: ICD-10-CM

## 2021-04-07 PROCEDURE — 92202 OPSCPY EXTND ON/MAC DRAW: CPT

## 2021-04-07 PROCEDURE — 92242 FLUORESCEIN&ICG ANGIOGRAPHY: CPT

## 2021-04-07 PROCEDURE — J0178PRE EYLEA PREFILLED SYRINGE

## 2021-04-07 PROCEDURE — 92134 CPTRZ OPH DX IMG PST SGM RTA: CPT

## 2021-04-07 PROCEDURE — 99213 OFFICE O/P EST LOW 20 MIN: CPT

## 2021-04-07 PROCEDURE — 67028 INJECTION EYE DRUG: CPT

## 2021-04-07 ASSESSMENT — VISUAL ACUITY
OD_SC: 20/30-1
OS_SC: 20/30-1

## 2021-04-07 ASSESSMENT — TONOMETRY
OS_IOP_MMHG: 13
OD_IOP_MMHG: 12

## 2021-06-02 ENCOUNTER — ESTABLISHED PATIENT (OUTPATIENT)
Dept: URBAN - METROPOLITAN AREA CLINIC 39 | Facility: CLINIC | Age: 76
End: 2021-06-02

## 2021-06-02 DIAGNOSIS — H34.8310: ICD-10-CM

## 2021-06-02 DIAGNOSIS — H35.363: ICD-10-CM

## 2021-06-02 DIAGNOSIS — H35.3132: ICD-10-CM

## 2021-06-02 DIAGNOSIS — H35.723: ICD-10-CM

## 2021-06-02 DIAGNOSIS — H43.813: ICD-10-CM

## 2021-06-02 DIAGNOSIS — H35.30: ICD-10-CM

## 2021-06-02 PROCEDURE — J0178PRE EYLEA PREFILLED SYRINGE

## 2021-06-02 PROCEDURE — 67028 INJECTION EYE DRUG: CPT

## 2021-06-02 PROCEDURE — 99213 OFFICE O/P EST LOW 20 MIN: CPT

## 2021-06-02 PROCEDURE — 92250 FUNDUS PHOTOGRAPHY W/I&R: CPT

## 2021-06-02 ASSESSMENT — VISUAL ACUITY
OD_SC: 20/40
OS_SC: 20/25

## 2021-06-02 ASSESSMENT — TONOMETRY
OS_IOP_MMHG: 13
OD_IOP_MMHG: 13

## 2021-07-28 ENCOUNTER — ESTABLISHED PATIENT (OUTPATIENT)
Dept: URBAN - METROPOLITAN AREA CLINIC 39 | Facility: CLINIC | Age: 76
End: 2021-07-28

## 2021-07-28 DIAGNOSIS — H43.813: ICD-10-CM

## 2021-07-28 DIAGNOSIS — H35.3132: ICD-10-CM

## 2021-07-28 DIAGNOSIS — H34.8310: ICD-10-CM

## 2021-07-28 DIAGNOSIS — H35.723: ICD-10-CM

## 2021-07-28 PROCEDURE — J0178PRE EYLEA PREFILLED SYRINGE

## 2021-07-28 PROCEDURE — 99213 OFFICE O/P EST LOW 20 MIN: CPT

## 2021-07-28 PROCEDURE — 92273 FULL FIELD ERG W/I&R: CPT

## 2021-07-28 PROCEDURE — 92235 FLUORESCEIN ANGRPH MLTIFRAME: CPT

## 2021-07-28 PROCEDURE — 67028 INJECTION EYE DRUG: CPT

## 2021-07-28 PROCEDURE — 92250 FUNDUS PHOTOGRAPHY W/I&R: CPT

## 2021-07-28 ASSESSMENT — VISUAL ACUITY
OS_SC: 20/30-2
OD_SC: 20/40

## 2021-07-28 ASSESSMENT — TONOMETRY
OS_IOP_MMHG: 14
OD_IOP_MMHG: 16

## 2021-08-04 NOTE — PATIENT DISCUSSION
DISCUSSED AT LENGTH THE DX, IMAGES, PROGNOSIS AND TX PLAN. PT VOICED UNDERSTANDING. ALL QUESTIONS WERE ANSWERED.

## 2021-08-04 NOTE — PATIENT DISCUSSION
No retinal holes or tears seen on exam OD, SUSPICIOUS AREAS OS. Recommended OBSERVATION OD. OS AS ABOVE. We reviewed the signs and symptoms of retinal tear/retinal detachment and the importance of prompt evaluation should there be increasing floaters, new flashing lights, or decreasing peripheral vision in either eye at any time. Patient understands condition, prognosis and need for follow up care.

## 2021-08-04 NOTE — PATIENT DISCUSSION
No retinal tears or retinal detachment seen on clinical exam today OD. OS AS ABOVE. Deven Christianson Reviewed the signs and symptoms of retinal tear/retinal detachment and the importance of calling for prompt evaluation should there be increasing floaters, new flashing lights, or decreasing peripheral vision in either eye at any time. Observation recommended.

## 2021-08-06 NOTE — PATIENT DISCUSSION
No retinal tears or retinal detachment seen on clinical exam today OD. OS AS ABOVE. Rich Rodriguez Reviewed the signs and symptoms of retinal tear/retinal detachment and the importance of calling for prompt evaluation should there be increasing floaters, new flashing lights, or decreasing peripheral vision in either eye at any time. Observation recommended.

## 2021-09-10 NOTE — PATIENT DISCUSSION
No retinal tears or retinal detachment seen on clinical exam today OD. OS AS ABOVE. Alex Romero Reviewed the signs and symptoms of retinal tear/retinal detachment and the importance of calling for prompt evaluation should there be increasing floaters, new flashing lights, or decreasing peripheral vision in either eye at any time. Observation recommended.

## 2021-09-22 ENCOUNTER — ESTABLISHED PATIENT (OUTPATIENT)
Dept: URBAN - METROPOLITAN AREA CLINIC 39 | Facility: CLINIC | Age: 76
End: 2021-09-22

## 2021-09-22 DIAGNOSIS — H35.723: ICD-10-CM

## 2021-09-22 DIAGNOSIS — H35.30: ICD-10-CM

## 2021-09-22 DIAGNOSIS — H43.813: ICD-10-CM

## 2021-09-22 DIAGNOSIS — H35.3132: ICD-10-CM

## 2021-09-22 DIAGNOSIS — H35.363: ICD-10-CM

## 2021-09-22 DIAGNOSIS — H34.8310: ICD-10-CM

## 2021-09-22 PROCEDURE — 99213 OFFICE O/P EST LOW 20 MIN: CPT

## 2021-09-22 PROCEDURE — 92250 FUNDUS PHOTOGRAPHY W/I&R: CPT

## 2021-09-22 ASSESSMENT — TONOMETRY
OS_IOP_MMHG: 16
OD_IOP_MMHG: 17

## 2021-09-22 ASSESSMENT — VISUAL ACUITY
OS_SC: 20/30-1
OD_SC: 20/30-1

## 2021-11-17 ENCOUNTER — ESTABLISHED PATIENT (OUTPATIENT)
Dept: URBAN - METROPOLITAN AREA CLINIC 39 | Facility: CLINIC | Age: 76
End: 2021-11-17

## 2021-11-17 DIAGNOSIS — H35.3132: ICD-10-CM

## 2021-11-17 DIAGNOSIS — H35.723: ICD-10-CM

## 2021-11-17 DIAGNOSIS — H43.813: ICD-10-CM

## 2021-11-17 DIAGNOSIS — E11.9: ICD-10-CM

## 2021-11-17 DIAGNOSIS — H35.031: ICD-10-CM

## 2021-11-17 DIAGNOSIS — H34.8310: ICD-10-CM

## 2021-11-17 PROCEDURE — 92273 FULL FIELD ERG W/I&R: CPT

## 2021-11-17 PROCEDURE — 67028 INJECTION EYE DRUG: CPT

## 2021-11-17 PROCEDURE — 92134 CPTRZ OPH DX IMG PST SGM RTA: CPT

## 2021-11-17 PROCEDURE — 99213 OFFICE O/P EST LOW 20 MIN: CPT

## 2021-11-17 ASSESSMENT — VISUAL ACUITY
OS_SC: 20/25-1
OD_SC: 20/60-2

## 2021-11-17 ASSESSMENT — TONOMETRY
OS_IOP_MMHG: 12
OD_IOP_MMHG: 14

## 2021-12-29 ENCOUNTER — COMPREHENSIVE EXAM (OUTPATIENT)
Dept: URBAN - METROPOLITAN AREA CLINIC 39 | Facility: CLINIC | Age: 76
End: 2021-12-29

## 2021-12-29 DIAGNOSIS — Z96.1: ICD-10-CM

## 2021-12-29 DIAGNOSIS — H35.363: ICD-10-CM

## 2021-12-29 DIAGNOSIS — H35.30: ICD-10-CM

## 2021-12-29 DIAGNOSIS — H43.813: ICD-10-CM

## 2021-12-29 DIAGNOSIS — H35.723: ICD-10-CM

## 2021-12-29 DIAGNOSIS — H35.3132: ICD-10-CM

## 2021-12-29 DIAGNOSIS — H34.8310: ICD-10-CM

## 2021-12-29 PROCEDURE — 92014 COMPRE OPH EXAM EST PT 1/>: CPT

## 2021-12-29 PROCEDURE — 92134 CPTRZ OPH DX IMG PST SGM RTA: CPT

## 2021-12-29 PROCEDURE — 92015 DETERMINE REFRACTIVE STATE: CPT

## 2021-12-29 ASSESSMENT — VISUAL ACUITY
OS_CC: J2
OD_SC: <J10
OU_SC: J3
OU_CC: J1
OD_SC: 20/400
OU_SC: 20/40+2
OS_SC: J5
OS_SC: 20/40-1
OD_CC: J3

## 2021-12-29 ASSESSMENT — TONOMETRY
OS_IOP_MMHG: 12
OD_IOP_MMHG: 12

## 2022-01-12 ENCOUNTER — ESTABLISHED PATIENT (OUTPATIENT)
Dept: URBAN - METROPOLITAN AREA CLINIC 39 | Facility: CLINIC | Age: 77
End: 2022-01-12

## 2022-01-12 DIAGNOSIS — H35.363: ICD-10-CM

## 2022-01-12 DIAGNOSIS — H35.723: ICD-10-CM

## 2022-01-12 DIAGNOSIS — H34.8310: ICD-10-CM

## 2022-01-12 DIAGNOSIS — H43.813: ICD-10-CM

## 2022-01-12 DIAGNOSIS — H35.3132: ICD-10-CM

## 2022-01-12 PROCEDURE — 67028 INJECTION EYE DRUG: CPT

## 2022-01-12 PROCEDURE — 99213 OFFICE O/P EST LOW 20 MIN: CPT

## 2022-01-12 PROCEDURE — J0178PRE EYLEA PREFILLED SYRINGE

## 2022-01-12 PROCEDURE — 92250 FUNDUS PHOTOGRAPHY W/I&R: CPT

## 2022-01-12 PROCEDURE — 92235 FLUORESCEIN ANGRPH MLTIFRAME: CPT

## 2022-01-12 ASSESSMENT — TONOMETRY
OD_IOP_MMHG: 16
OS_IOP_MMHG: 16

## 2022-01-12 ASSESSMENT — VISUAL ACUITY
OD_CC: 20/70
OS_CC: 20/40+2

## 2022-05-04 ENCOUNTER — ESTABLISHED PATIENT (OUTPATIENT)
Dept: URBAN - METROPOLITAN AREA CLINIC 39 | Facility: CLINIC | Age: 77
End: 2022-05-04

## 2022-05-04 DIAGNOSIS — H35.723: ICD-10-CM

## 2022-05-04 DIAGNOSIS — H21.89: ICD-10-CM

## 2022-05-04 DIAGNOSIS — H43.813: ICD-10-CM

## 2022-05-04 DIAGNOSIS — H35.041: ICD-10-CM

## 2022-05-04 DIAGNOSIS — H35.363: ICD-10-CM

## 2022-05-04 DIAGNOSIS — H34.8310: ICD-10-CM

## 2022-05-04 DIAGNOSIS — H35.031: ICD-10-CM

## 2022-05-04 DIAGNOSIS — H31.013: ICD-10-CM

## 2022-05-04 DIAGNOSIS — E11.9: ICD-10-CM

## 2022-05-04 DIAGNOSIS — H35.30: ICD-10-CM

## 2022-05-04 PROCEDURE — 67028 INJECTION EYE DRUG: CPT

## 2022-05-04 PROCEDURE — 92235 FLUORESCEIN ANGRPH MLTIFRAME: CPT

## 2022-05-04 PROCEDURE — J0178PRE EYLEA PREFILLED SYRINGE

## 2022-05-04 PROCEDURE — 92273 FULL FIELD ERG W/I&R: CPT

## 2022-05-04 PROCEDURE — 99213 OFFICE O/P EST LOW 20 MIN: CPT

## 2022-05-04 PROCEDURE — 92250 FUNDUS PHOTOGRAPHY W/I&R: CPT

## 2022-05-04 ASSESSMENT — VISUAL ACUITY
OD_CC: 20/100
OS_CC: 20/40

## 2022-05-04 ASSESSMENT — TONOMETRY
OD_IOP_MMHG: 13
OS_IOP_MMHG: 12

## 2022-06-29 ENCOUNTER — CLINIC PROCEDURE ONLY (OUTPATIENT)
Dept: URBAN - METROPOLITAN AREA CLINIC 39 | Facility: CLINIC | Age: 77
End: 2022-06-29

## 2022-06-29 DIAGNOSIS — H34.8310: ICD-10-CM

## 2022-06-29 PROCEDURE — 92250 FUNDUS PHOTOGRAPHY W/I&R: CPT

## 2022-06-29 PROCEDURE — J0178PRE EYLEA PREFILLED SYRINGE

## 2022-06-29 PROCEDURE — 67028 INJECTION EYE DRUG: CPT

## 2022-06-29 ASSESSMENT — VISUAL ACUITY
OD_CC: 20/70
OS_CC: 20/25-2

## 2022-06-29 ASSESSMENT — TONOMETRY
OD_IOP_MMHG: 13
OS_IOP_MMHG: 13

## 2022-09-13 NOTE — PATIENT DISCUSSION
9/13/22: No retinal tears or retinal detachment seen on clinical exam today OD. OS AS ABOVE. Rich Rodriguez Reviewed the signs and symptoms of retinal tear/retinal detachment and the importance of calling for prompt evaluation should there be increasing floaters, new flashing lights, or decreasing peripheral vision in either eye at any time. Observation recommended.

## 2022-09-13 NOTE — PATIENT DISCUSSION
9/13/22: IOP REMAINS NORMAL, HOWEVER PFH+ AND GROWING NOTCH INFEROTEMPORALLY MAKES ME CONCERN ABOUT PROGRESSION. RECOMMEND EVAL WITH HVF AND RNFL OCT.